# Patient Record
Sex: MALE | Race: ASIAN | NOT HISPANIC OR LATINO | URBAN - METROPOLITAN AREA
[De-identification: names, ages, dates, MRNs, and addresses within clinical notes are randomized per-mention and may not be internally consistent; named-entity substitution may affect disease eponyms.]

---

## 2017-01-14 ENCOUNTER — EMERGENCY (EMERGENCY)
Facility: HOSPITAL | Age: 47
LOS: 0 days | Discharge: HOME | End: 2017-01-14
Admitting: EMERGENCY MEDICINE
Payer: COMMERCIAL

## 2017-01-14 DIAGNOSIS — M54.5 LOW BACK PAIN: ICD-10-CM

## 2017-01-14 DIAGNOSIS — S39.82XA OTHER SPECIFIED INJURIES OF LOWER BACK, INITIAL ENCOUNTER: ICD-10-CM
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
## 2017-01-14 DIAGNOSIS — S33.5XXA SPRAIN OF LIGAMENTS OF LUMBAR SPINE, INITIAL ENCOUNTER: ICD-10-CM
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
## 2017-01-14 PROCEDURE — 72100 X-RAY EXAM L-S SPINE 2/3 VWS: CPT | Mod: 26

## 2017-01-14 PROCEDURE — 99283 EMERGENCY DEPT VISIT LOW MDM: CPT

## 2017-06-05 ENCOUNTER — OUTPATIENT (OUTPATIENT)
Dept: OUTPATIENT SERVICES | Facility: HOSPITAL | Age: 47
LOS: 1 days | Discharge: HOME | End: 2017-06-05
Payer: SELF-PAY

## 2017-06-05 DIAGNOSIS — Z02.1 ENCOUNTER FOR PRE-EMPLOYMENT EXAMINATION: ICD-10-CM

## 2017-06-05 PROCEDURE — 71020: CPT | Mod: 26

## 2018-02-16 ENCOUNTER — TRANSCRIPTION ENCOUNTER (OUTPATIENT)
Age: 48
End: 2018-02-16

## 2019-03-01 ENCOUNTER — APPOINTMENT (OUTPATIENT)
Dept: DERMATOLOGY | Facility: CLINIC | Age: 49
End: 2019-03-01
Payer: COMMERCIAL

## 2019-03-01 VITALS — BODY MASS INDEX: 27.31 KG/M2 | WEIGHT: 160 LBS | HEIGHT: 64 IN

## 2019-03-01 DIAGNOSIS — B36.0 PITYRIASIS VERSICOLOR: ICD-10-CM

## 2019-03-01 PROCEDURE — 99202 OFFICE O/P NEW SF 15 MIN: CPT

## 2019-03-01 RX ORDER — KETOCONAZOLE 20.5 MG/ML
2 SHAMPOO, SUSPENSION TOPICAL
Qty: 1 | Refills: 6 | Status: ACTIVE | COMMUNITY
Start: 2019-03-01 | End: 1900-01-01

## 2019-03-01 RX ORDER — FLUCONAZOLE 150 MG/1
150 TABLET ORAL
Qty: 4 | Refills: 0 | Status: ACTIVE | COMMUNITY
Start: 2019-03-01 | End: 1900-01-01

## 2019-03-29 ENCOUNTER — APPOINTMENT (OUTPATIENT)
Dept: ULTRASOUND IMAGING | Facility: IMAGING CENTER | Age: 49
End: 2019-03-29
Payer: COMMERCIAL

## 2019-03-29 ENCOUNTER — OUTPATIENT (OUTPATIENT)
Dept: OUTPATIENT SERVICES | Facility: HOSPITAL | Age: 49
LOS: 1 days | End: 2019-03-29
Payer: COMMERCIAL

## 2019-03-29 DIAGNOSIS — Z00.8 ENCOUNTER FOR OTHER GENERAL EXAMINATION: ICD-10-CM

## 2019-03-29 PROCEDURE — 76700 US EXAM ABDOM COMPLETE: CPT

## 2019-03-29 PROCEDURE — 76700 US EXAM ABDOM COMPLETE: CPT | Mod: 26

## 2019-07-05 RX ORDER — KETOCONAZOLE 20 MG/G
2 CREAM TOPICAL
Qty: 2 | Refills: 3 | Status: ACTIVE | COMMUNITY
Start: 2019-03-01 | End: 1900-01-01

## 2019-07-22 ENCOUNTER — RESULT REVIEW (OUTPATIENT)
Age: 49
End: 2019-07-22

## 2019-10-15 ENCOUNTER — APPOINTMENT (OUTPATIENT)
Dept: ULTRASOUND IMAGING | Facility: IMAGING CENTER | Age: 49
End: 2019-10-15

## 2019-10-18 ENCOUNTER — APPOINTMENT (OUTPATIENT)
Dept: MRI IMAGING | Facility: IMAGING CENTER | Age: 49
End: 2019-10-18

## 2019-11-09 ENCOUNTER — OUTPATIENT (OUTPATIENT)
Dept: OUTPATIENT SERVICES | Facility: HOSPITAL | Age: 49
LOS: 1 days | End: 2019-11-09
Payer: COMMERCIAL

## 2019-11-09 ENCOUNTER — APPOINTMENT (OUTPATIENT)
Dept: ULTRASOUND IMAGING | Facility: IMAGING CENTER | Age: 49
End: 2019-11-09
Payer: COMMERCIAL

## 2019-11-09 ENCOUNTER — APPOINTMENT (OUTPATIENT)
Dept: MRI IMAGING | Facility: IMAGING CENTER | Age: 49
End: 2019-11-09
Payer: COMMERCIAL

## 2019-11-09 DIAGNOSIS — Z00.8 ENCOUNTER FOR OTHER GENERAL EXAMINATION: ICD-10-CM

## 2019-11-09 PROCEDURE — 72148 MRI LUMBAR SPINE W/O DYE: CPT

## 2019-11-09 PROCEDURE — 76857 US EXAM PELVIC LIMITED: CPT | Mod: 26

## 2019-11-09 PROCEDURE — 72148 MRI LUMBAR SPINE W/O DYE: CPT | Mod: 26

## 2019-11-09 PROCEDURE — 76857 US EXAM PELVIC LIMITED: CPT

## 2020-04-01 ENCOUNTER — EMERGENCY (EMERGENCY)
Facility: HOSPITAL | Age: 50
LOS: 0 days | Discharge: HOME | End: 2020-04-01
Payer: COMMERCIAL

## 2020-04-01 VITALS
OXYGEN SATURATION: 94 % | DIASTOLIC BLOOD PRESSURE: 95 MMHG | RESPIRATION RATE: 18 BRPM | TEMPERATURE: 99 F | HEART RATE: 78 BPM | SYSTOLIC BLOOD PRESSURE: 162 MMHG

## 2020-04-01 VITALS — OXYGEN SATURATION: 97 %

## 2020-04-01 DIAGNOSIS — R05 COUGH: ICD-10-CM

## 2020-04-01 DIAGNOSIS — R50.9 FEVER, UNSPECIFIED: ICD-10-CM

## 2020-04-01 DIAGNOSIS — J06.9 ACUTE UPPER RESPIRATORY INFECTION, UNSPECIFIED: ICD-10-CM

## 2020-04-01 DIAGNOSIS — Z20.828 CONTACT WITH AND (SUSPECTED) EXPOSURE TO OTHER VIRAL COMMUNICABLE DISEASES: ICD-10-CM

## 2020-04-01 PROCEDURE — 99282 EMERGENCY DEPT VISIT SF MDM: CPT

## 2020-04-01 PROCEDURE — 99283 EMERGENCY DEPT VISIT LOW MDM: CPT

## 2020-04-01 PROCEDURE — 87635 SARS-COV-2 COVID-19 AMP PRB: CPT

## 2020-04-01 NOTE — ED STATDOCS - OBJECTIVE STATEMENT
Pt presents to ED with fever, cough, runny nose, body aches, sore throat x7  days. Pt recently exposed to COVID-19. Pt here for testing.

## 2020-04-01 NOTE — ED ADULT NURSE NOTE - CHIEF COMPLAINT QUOTE
pt presents to ED due to complaints of flu like sxs with fever and cough  Patient is an employee of [employer]. Pt has given verbal consent for employee health to call them with results.

## 2020-04-01 NOTE — ED STATDOCS - NS ED ROS FT
ROS: Constitutional- +fever, +chills.  Respiratory- +cough, -SOB  Cardiac- no chest pain, no palpitations, ENT- +rhinorrhea, no sore throat, no congestion.  Abdomen- No nausea, no vomiting, +diarrhea.  Urinary- no dysuria, no urgency, no frequency.  Skin- No rashes

## 2020-04-01 NOTE — ED STATDOCS - PMH
No pertinent past medical history <<----- Click to add NO pertinent Past Medical History Hypertension, unspecified type    Type 2 diabetes mellitus without complication, without long-term current use of insulin

## 2020-04-01 NOTE — ED STATDOCS - PATIENT PORTAL LINK FT
You can access the FollowMyHealth Patient Portal offered by [facility] by registering at the following website: [email or web address]. By joining [facility]’s FollowMyHealth portal, you will also be able to view your health information using other applications (apps) compatible with our system.

## 2020-04-01 NOTE — ED STATDOCS - NSFOLLOWUPINSTRUCTIONS_ED_ALL_ED_FT
How to get your Coronavirus (COVID-19) Testing Results:   Please be advised that you were tested for the coronavirus (COVID-19) in the Emergency Department at [facility].  You are to maintain self-quarantine procedures for 14 days until instructed otherwise by one of our healthcare agents. Please note that the test may take up to 2-4 days to result.  If you do not hear from us within 72 hours and you'd like to check on your results, you can call on of our coronavirus specialists at [address] [place] [address] (available 24/7).  Please DO NOT call the site where you received the test to obtain your results.     take tylenol as needed, return to the hospital if you have shortness of breath

## 2020-04-02 ENCOUNTER — APPOINTMENT (OUTPATIENT)
Dept: DISASTER EMERGENCY | Facility: CLINIC | Age: 50
End: 2020-04-02

## 2020-04-02 LAB — SARS-COV-2 RNA SPEC QL NAA+PROBE: SIGNIFICANT CHANGE UP

## 2020-04-26 ENCOUNTER — MESSAGE (OUTPATIENT)
Age: 50
End: 2020-04-26

## 2020-05-02 LAB
SARS-COV-2 IGG SERPL IA-ACNC: <0.1 INDEX
SARS-COV-2 IGG SERPL QL IA: NEGATIVE

## 2020-08-18 ENCOUNTER — EMERGENCY (EMERGENCY)
Facility: HOSPITAL | Age: 50
LOS: 0 days | Discharge: HOME | End: 2020-08-18
Payer: COMMERCIAL

## 2020-08-18 VITALS
HEART RATE: 85 BPM | RESPIRATION RATE: 18 BRPM | TEMPERATURE: 99 F | SYSTOLIC BLOOD PRESSURE: 137 MMHG | DIASTOLIC BLOOD PRESSURE: 85 MMHG | OXYGEN SATURATION: 96 %

## 2020-08-18 DIAGNOSIS — Z11.59 ENCOUNTER FOR SCREENING FOR OTHER VIRAL DISEASES: ICD-10-CM

## 2020-08-18 DIAGNOSIS — I10 ESSENTIAL (PRIMARY) HYPERTENSION: ICD-10-CM

## 2020-08-18 DIAGNOSIS — E11.9 TYPE 2 DIABETES MELLITUS WITHOUT COMPLICATIONS: ICD-10-CM

## 2020-08-18 PROCEDURE — 99283 EMERGENCY DEPT VISIT LOW MDM: CPT

## 2020-08-18 PROCEDURE — U0003: CPT

## 2020-08-18 NOTE — ED STATDOCS - PATIENT PORTAL LINK FT
You can access the FollowMyHealth Patient Portal offered by [facility] by registering at the following website: [email or web address]. By joining [facility]’s FollowMyHealth portal, you will also be able to view your health information using other applications (apps) compatible with our system.

## 2020-08-18 NOTE — ED STATDOCS - OBJECTIVE STATEMENT
51 yo male,  employee presents for covid testing. Pt drove to [place] to drop off his son in school and requires testing since its a "[place] zone" state.

## 2020-08-19 PROBLEM — I10 ESSENTIAL (PRIMARY) HYPERTENSION: Chronic | Status: ACTIVE | Noted: 2020-04-01

## 2020-08-19 PROBLEM — E11.9 TYPE 2 DIABETES MELLITUS WITHOUT COMPLICATIONS: Chronic | Status: ACTIVE | Noted: 2020-04-01

## 2020-08-20 LAB — SARS-COV-2 RNA SPEC QL NAA+PROBE: SIGNIFICANT CHANGE UP

## 2021-01-17 ENCOUNTER — TRANSCRIPTION ENCOUNTER (OUTPATIENT)
Age: 51
End: 2021-01-17

## 2021-06-01 ENCOUNTER — APPOINTMENT (OUTPATIENT)
Dept: CARDIOLOGY | Facility: CLINIC | Age: 51
End: 2021-06-01
Payer: COMMERCIAL

## 2021-06-01 ENCOUNTER — NON-APPOINTMENT (OUTPATIENT)
Age: 51
End: 2021-06-01

## 2021-06-01 VITALS
HEIGHT: 65 IN | WEIGHT: 173 LBS | SYSTOLIC BLOOD PRESSURE: 114 MMHG | OXYGEN SATURATION: 95 % | HEART RATE: 85 BPM | DIASTOLIC BLOOD PRESSURE: 70 MMHG | BODY MASS INDEX: 28.82 KG/M2

## 2021-06-01 DIAGNOSIS — E78.5 HYPERLIPIDEMIA, UNSPECIFIED: ICD-10-CM

## 2021-06-01 DIAGNOSIS — I10 ESSENTIAL (PRIMARY) HYPERTENSION: ICD-10-CM

## 2021-06-01 DIAGNOSIS — Z82.49 FAMILY HISTORY OF ISCHEMIC HEART DISEASE AND OTHER DISEASES OF THE CIRCULATORY SYSTEM: ICD-10-CM

## 2021-06-01 DIAGNOSIS — Z92.29 PERSONAL HISTORY OF OTHER DRUG THERAPY: ICD-10-CM

## 2021-06-01 DIAGNOSIS — K76.0 FATTY (CHANGE OF) LIVER, NOT ELSEWHERE CLASSIFIED: ICD-10-CM

## 2021-06-01 DIAGNOSIS — E11.9 TYPE 2 DIABETES MELLITUS W/OUT COMPLICATIONS: ICD-10-CM

## 2021-06-01 DIAGNOSIS — Z87.891 PERSONAL HISTORY OF NICOTINE DEPENDENCE: ICD-10-CM

## 2021-06-01 DIAGNOSIS — R06.00 DYSPNEA, UNSPECIFIED: ICD-10-CM

## 2021-06-01 PROCEDURE — 99204 OFFICE O/P NEW MOD 45 MIN: CPT

## 2021-06-01 PROCEDURE — 99072 ADDL SUPL MATRL&STAF TM PHE: CPT

## 2021-06-01 PROCEDURE — 93000 ELECTROCARDIOGRAM COMPLETE: CPT

## 2021-06-01 RX ORDER — HYDROCHLOROTHIAZIDE 12.5 MG/1
12.5 TABLET ORAL DAILY
Qty: 90 | Refills: 1 | Status: ACTIVE | COMMUNITY
Start: 2021-06-01

## 2021-06-01 RX ORDER — FAMOTIDINE 20 MG/1
20 TABLET, FILM COATED ORAL
Qty: 60 | Refills: 0 | Status: ACTIVE | COMMUNITY
Start: 2021-06-01

## 2021-06-01 RX ORDER — IRBESARTAN 75 MG/1
75 TABLET ORAL
Qty: 30 | Refills: 0 | Status: ACTIVE | COMMUNITY
Start: 2021-06-01

## 2021-06-01 RX ORDER — ASPIRIN ENTERIC COATED TABLETS 81 MG 81 MG/1
81 TABLET, DELAYED RELEASE ORAL
Qty: 30 | Refills: 3 | Status: ACTIVE | COMMUNITY
Start: 2021-06-01

## 2021-06-01 RX ORDER — METFORMIN HYDROCHLORIDE 500 MG/1
500 TABLET, COATED ORAL DAILY
Qty: 90 | Refills: 1 | Status: ACTIVE | COMMUNITY
Start: 2021-06-01

## 2021-06-01 NOTE — HISTORY OF PRESENT ILLNESS
[FreeTextEntry1] : Pt presents today as a new patient referred by PCP Dr. Peters for CAD workup.\par The patient presents for evaluation of high blood pressure. Patient is currently tolerating the current antihypertensive regime and they deny headaches, stiff neck, visual changes, pedal Edema or PND. They also are here for follow-up of elevated cholesterol and continued care of diabetes mellitus. Patient is currently tolerating medication and denies muscle pain, joint pain, back pain, tea, nausea, vomiting, abdominal pain or diarrhea. The patient is trying to follow a low cholesterol diet.  The patient is following the diabetic regimen and is tolerating hypoglycemic agents and following the diet.\par The patient presents today with complaints of occasional episodes of dyspnea that occur with rest and and exertion there's no regular  discernible pattern the patient does not feel it is associated with anxiety patient denies substernal chest pain, PND, with orthopnea and palpitations\par s/p labs at PCP  showed a1c 5/7, trigs 219 LDL 75\par pt had syncope  2-3 years. pt is a former smoker.\par \par pt 's  father who  at age %&  yo from [place].

## 2021-06-08 ENCOUNTER — APPOINTMENT (OUTPATIENT)
Dept: CT IMAGING | Facility: CLINIC | Age: 51
End: 2021-06-08
Payer: SELF-PAY

## 2021-06-08 ENCOUNTER — OUTPATIENT (OUTPATIENT)
Dept: OUTPATIENT SERVICES | Facility: HOSPITAL | Age: 51
LOS: 1 days | End: 2021-06-08
Payer: SELF-PAY

## 2021-06-08 DIAGNOSIS — Z82.49 FAMILY HISTORY OF ISCHEMIC HEART DISEASE AND OTHER DISEASES OF THE CIRCULATORY SYSTEM: ICD-10-CM

## 2021-06-08 DIAGNOSIS — E11.9 TYPE 2 DIABETES MELLITUS WITHOUT COMPLICATIONS: ICD-10-CM

## 2021-06-08 DIAGNOSIS — K76.0 FATTY (CHANGE OF) LIVER, NOT ELSEWHERE CLASSIFIED: ICD-10-CM

## 2021-06-08 DIAGNOSIS — E78.5 HYPERLIPIDEMIA, UNSPECIFIED: ICD-10-CM

## 2021-06-08 PROCEDURE — 75571 CT HRT W/O DYE W/CA TEST: CPT

## 2021-06-08 PROCEDURE — 75571 CT HRT W/O DYE W/CA TEST: CPT | Mod: 26

## 2021-06-10 DIAGNOSIS — R91.1 SOLITARY PULMONARY NODULE: ICD-10-CM

## 2021-06-10 DIAGNOSIS — R93.1 ABNORMAL FINDINGS ON DIAGNOSTIC IMAGING OF HEART AND CORONARY CIRCULATION: ICD-10-CM

## 2021-06-10 RX ORDER — ROSUVASTATIN CALCIUM 20 MG/1
20 TABLET, FILM COATED ORAL DAILY
Qty: 90 | Refills: 0 | Status: ACTIVE | COMMUNITY
Start: 2021-06-10 | End: 1900-01-01

## 2021-06-10 RX ORDER — PRAVASTATIN SODIUM 20 MG/1
20 TABLET ORAL
Qty: 30 | Refills: 3 | Status: DISCONTINUED | COMMUNITY
Start: 2021-06-01 | End: 2021-06-10

## 2021-06-15 ENCOUNTER — OUTPATIENT (OUTPATIENT)
Dept: OUTPATIENT SERVICES | Facility: HOSPITAL | Age: 51
LOS: 1 days | End: 2021-06-15
Payer: COMMERCIAL

## 2021-06-15 ENCOUNTER — APPOINTMENT (OUTPATIENT)
Dept: ULTRASOUND IMAGING | Facility: IMAGING CENTER | Age: 51
End: 2021-06-15
Payer: COMMERCIAL

## 2021-06-15 DIAGNOSIS — Z00.8 ENCOUNTER FOR OTHER GENERAL EXAMINATION: ICD-10-CM

## 2021-06-15 PROCEDURE — 76705 ECHO EXAM OF ABDOMEN: CPT

## 2021-06-15 PROCEDURE — 76705 ECHO EXAM OF ABDOMEN: CPT | Mod: 26,RT

## 2021-06-24 ENCOUNTER — APPOINTMENT (OUTPATIENT)
Dept: CARDIOLOGY | Facility: CLINIC | Age: 51
End: 2021-06-24
Payer: COMMERCIAL

## 2021-06-24 PROCEDURE — 93015 CV STRESS TEST SUPVJ I&R: CPT

## 2021-06-24 PROCEDURE — A9500: CPT

## 2021-06-24 PROCEDURE — 78452 HT MUSCLE IMAGE SPECT MULT: CPT

## 2021-06-24 PROCEDURE — 93306 TTE W/DOPPLER COMPLETE: CPT

## 2021-06-24 PROCEDURE — 99072 ADDL SUPL MATRL&STAF TM PHE: CPT

## 2021-06-28 ENCOUNTER — NON-APPOINTMENT (OUTPATIENT)
Age: 51
End: 2021-06-28

## 2021-10-06 PROBLEM — I10 ESSENTIAL HYPERTENSION: Status: ACTIVE | Noted: 2021-06-01

## 2021-12-21 ENCOUNTER — OUTPATIENT (OUTPATIENT)
Dept: OUTPATIENT SERVICES | Facility: HOSPITAL | Age: 51
LOS: 1 days | End: 2021-12-21
Payer: COMMERCIAL

## 2021-12-21 DIAGNOSIS — Z20.828 CONTACT WITH AND (SUSPECTED) EXPOSURE TO OTHER VIRAL COMMUNICABLE DISEASES: ICD-10-CM

## 2021-12-21 LAB — SARS-COV-2 RNA SPEC QL NAA+PROBE: SIGNIFICANT CHANGE UP

## 2021-12-21 PROCEDURE — U0005: CPT

## 2021-12-21 PROCEDURE — U0003: CPT

## 2021-12-22 DIAGNOSIS — Z20.828 CONTACT WITH AND (SUSPECTED) EXPOSURE TO OTHER VIRAL COMMUNICABLE DISEASES: ICD-10-CM

## 2022-04-12 ENCOUNTER — APPOINTMENT (OUTPATIENT)
Dept: ULTRASOUND IMAGING | Facility: CLINIC | Age: 52
End: 2022-04-12
Payer: COMMERCIAL

## 2022-04-12 ENCOUNTER — OUTPATIENT (OUTPATIENT)
Dept: OUTPATIENT SERVICES | Facility: HOSPITAL | Age: 52
LOS: 1 days | End: 2022-04-12
Payer: COMMERCIAL

## 2022-04-12 DIAGNOSIS — Z00.8 ENCOUNTER FOR OTHER GENERAL EXAMINATION: ICD-10-CM

## 2022-04-12 PROCEDURE — 76770 US EXAM ABDO BACK WALL COMP: CPT

## 2022-04-12 PROCEDURE — 76770 US EXAM ABDO BACK WALL COMP: CPT | Mod: 26

## 2022-04-29 ENCOUNTER — RESULT REVIEW (OUTPATIENT)
Age: 52
End: 2022-04-29

## 2022-05-03 ENCOUNTER — APPOINTMENT (OUTPATIENT)
Dept: UROLOGY | Facility: CLINIC | Age: 52
End: 2022-05-03
Payer: COMMERCIAL

## 2022-05-03 VITALS
OXYGEN SATURATION: 89 % | BODY MASS INDEX: 28.82 KG/M2 | HEIGHT: 65 IN | DIASTOLIC BLOOD PRESSURE: 82 MMHG | RESPIRATION RATE: 17 BRPM | SYSTOLIC BLOOD PRESSURE: 127 MMHG | HEART RATE: 89 BPM | WEIGHT: 173 LBS | TEMPERATURE: 98.1 F

## 2022-05-03 DIAGNOSIS — N20.0 CALCULUS OF KIDNEY: ICD-10-CM

## 2022-05-03 PROCEDURE — 99203 OFFICE O/P NEW LOW 30 MIN: CPT

## 2022-05-03 NOTE — ASSESSMENT
[FreeTextEntry1] : reviewed ULS with patient - small stone. Don't feel related to his discomfort - small and asymptomatic so observe.\par No hernia on exam - F/U PRN

## 2022-05-03 NOTE — HISTORY OF PRESENT ILLNESS
[FreeTextEntry1] : referred for management of a renal stone.\par he recalls having some pain on left side and told he had a small left renal stone.\par More recently noted intermittent groin pains, especially turning in bed.\par Had recent ULS noting 4mm mid pole left stone. No hydro and normal bladder.\par no hematuria, dysuria, UTI symptoms. Notes normal voiding habits other than occasional nocturia once.

## 2022-05-03 NOTE — PHYSICAL EXAM
[General Appearance - Well Developed] : well developed [General Appearance - Well Nourished] : well nourished [Edema] : no peripheral edema [Exaggerated Use Of Accessory Muscles For Inspiration] : no accessory muscle use [Abdomen Soft] : soft [Abdomen Tenderness] : non-tender [Abdomen Mass (___ Cm)] : no abdominal mass palpated [Abdomen Hernia] : no hernia was discovered [Urethral Meatus] : meatus normal [Penis Abnormality] : normal circumcised penis [Urinary Bladder Findings] : the bladder was normal on palpation [Scrotum] : the scrotum was normal [Epididymis] : the epididymides were normal [Testes Tenderness] : no tenderness of the testes [Testes Mass (___cm)] : there were no testicular masses [Normal Station and Gait] : the gait and station were normal for the patient's age [] : no rash [No Focal Deficits] : no focal deficits [Oriented To Time, Place, And Person] : oriented to person, place, and time

## 2022-05-04 LAB
APPEARANCE: CLEAR
BACTERIA UR CULT: NORMAL
BACTERIA: NEGATIVE
BILIRUBIN URINE: NEGATIVE
BLOOD URINE: NEGATIVE
COLOR: YELLOW
GLUCOSE QUALITATIVE U: NEGATIVE
HYALINE CASTS: 0 /LPF
KETONES URINE: NEGATIVE
LEUKOCYTE ESTERASE URINE: NEGATIVE
MICROSCOPIC-UA: NORMAL
NITRITE URINE: NEGATIVE
PH URINE: 6
PROTEIN URINE: NORMAL
RED BLOOD CELLS URINE: 2 /HPF
SPECIFIC GRAVITY URINE: 1.02
SQUAMOUS EPITHELIAL CELLS: 0 /HPF
UROBILINOGEN URINE: NORMAL
WHITE BLOOD CELLS URINE: 1 /HPF

## 2024-05-18 ENCOUNTER — OUTPATIENT (OUTPATIENT)
Dept: OUTPATIENT SERVICES | Facility: HOSPITAL | Age: 54
LOS: 1 days | End: 2024-05-18
Payer: COMMERCIAL

## 2024-05-18 ENCOUNTER — APPOINTMENT (OUTPATIENT)
Dept: MRI IMAGING | Facility: IMAGING CENTER | Age: 54
End: 2024-05-18
Payer: COMMERCIAL

## 2024-05-18 DIAGNOSIS — Z00.8 ENCOUNTER FOR OTHER GENERAL EXAMINATION: ICD-10-CM

## 2024-05-18 PROCEDURE — 73721 MRI JNT OF LWR EXTRE W/O DYE: CPT

## 2024-05-18 PROCEDURE — 73721 MRI JNT OF LWR EXTRE W/O DYE: CPT | Mod: 26,RT

## 2024-05-20 ENCOUNTER — APPOINTMENT (OUTPATIENT)
Dept: ULTRASOUND IMAGING | Facility: IMAGING CENTER | Age: 54
End: 2024-05-20

## 2024-07-17 ENCOUNTER — OUTPATIENT (OUTPATIENT)
Dept: OUTPATIENT SERVICES | Facility: HOSPITAL | Age: 54
LOS: 1 days | End: 2024-07-17
Payer: COMMERCIAL

## 2024-07-17 VITALS
TEMPERATURE: 99 F | HEIGHT: 64 IN | SYSTOLIC BLOOD PRESSURE: 130 MMHG | HEART RATE: 92 BPM | OXYGEN SATURATION: 95 % | WEIGHT: 171.96 LBS | RESPIRATION RATE: 18 BRPM | DIASTOLIC BLOOD PRESSURE: 90 MMHG

## 2024-07-17 DIAGNOSIS — S83.209A UNSPECIFIED TEAR OF UNSPECIFIED MENISCUS, CURRENT INJURY, UNSPECIFIED KNEE, INITIAL ENCOUNTER: ICD-10-CM

## 2024-07-17 DIAGNOSIS — E11.9 TYPE 2 DIABETES MELLITUS WITHOUT COMPLICATIONS: ICD-10-CM

## 2024-07-17 DIAGNOSIS — M25.569 PAIN IN UNSPECIFIED KNEE: ICD-10-CM

## 2024-07-17 DIAGNOSIS — I10 ESSENTIAL (PRIMARY) HYPERTENSION: ICD-10-CM

## 2024-07-17 LAB
ALBUMIN SERPL ELPH-MCNC: 4.9 G/DL — SIGNIFICANT CHANGE UP (ref 3.3–5)
ALP SERPL-CCNC: 112 U/L — SIGNIFICANT CHANGE UP (ref 40–120)
ALT FLD-CCNC: 54 U/L — HIGH (ref 10–45)
ANION GAP SERPL CALC-SCNC: 16 MMOL/L — SIGNIFICANT CHANGE UP (ref 5–17)
AST SERPL-CCNC: 42 U/L — HIGH (ref 10–40)
BILIRUB SERPL-MCNC: 1 MG/DL — SIGNIFICANT CHANGE UP (ref 0.2–1.2)
BUN SERPL-MCNC: 15 MG/DL — SIGNIFICANT CHANGE UP (ref 7–23)
CALCIUM SERPL-MCNC: 9.5 MG/DL — SIGNIFICANT CHANGE UP (ref 8.4–10.5)
CHLORIDE SERPL-SCNC: 100 MMOL/L — SIGNIFICANT CHANGE UP (ref 96–108)
CO2 SERPL-SCNC: 21 MMOL/L — LOW (ref 22–31)
CREAT SERPL-MCNC: 0.73 MG/DL — SIGNIFICANT CHANGE UP (ref 0.5–1.3)
EGFR: 107 ML/MIN/1.73M2 — SIGNIFICANT CHANGE UP
GLUCOSE SERPL-MCNC: 237 MG/DL — HIGH (ref 70–99)
HCT VFR BLD CALC: 41.6 % — SIGNIFICANT CHANGE UP (ref 39–50)
HGB BLD-MCNC: 14 G/DL — SIGNIFICANT CHANGE UP (ref 13–17)
MCHC RBC-ENTMCNC: 30.3 PG — SIGNIFICANT CHANGE UP (ref 27–34)
MCHC RBC-ENTMCNC: 33.7 GM/DL — SIGNIFICANT CHANGE UP (ref 32–36)
MCV RBC AUTO: 90 FL — SIGNIFICANT CHANGE UP (ref 80–100)
NRBC # BLD: 0 /100 WBCS — SIGNIFICANT CHANGE UP (ref 0–0)
PLATELET # BLD AUTO: 235 K/UL — SIGNIFICANT CHANGE UP (ref 150–400)
POTASSIUM SERPL-MCNC: 3.2 MMOL/L — LOW (ref 3.5–5.3)
POTASSIUM SERPL-SCNC: 3.2 MMOL/L — LOW (ref 3.5–5.3)
PROT SERPL-MCNC: 7.4 G/DL — SIGNIFICANT CHANGE UP (ref 6–8.3)
RBC # BLD: 4.62 M/UL — SIGNIFICANT CHANGE UP (ref 4.2–5.8)
RBC # FLD: 12.4 % — SIGNIFICANT CHANGE UP (ref 10.3–14.5)
SODIUM SERPL-SCNC: 137 MMOL/L — SIGNIFICANT CHANGE UP (ref 135–145)
WBC # BLD: 8.25 K/UL — SIGNIFICANT CHANGE UP (ref 3.8–10.5)
WBC # FLD AUTO: 8.25 K/UL — SIGNIFICANT CHANGE UP (ref 3.8–10.5)

## 2024-07-17 PROCEDURE — 80053 COMPREHEN METABOLIC PANEL: CPT

## 2024-07-17 PROCEDURE — G0463: CPT

## 2024-07-17 PROCEDURE — 83036 HEMOGLOBIN GLYCOSYLATED A1C: CPT

## 2024-07-17 PROCEDURE — 85027 COMPLETE CBC AUTOMATED: CPT

## 2024-07-17 RX ORDER — BACTERIOSTATIC SODIUM CHLORIDE 0.9 %
3 VIAL (ML) INJECTION EVERY 8 HOURS
Refills: 0 | Status: DISCONTINUED | OUTPATIENT
Start: 2024-07-31 | End: 2024-08-14

## 2024-07-17 RX ORDER — CHLORHEXIDINE GLUCONATE 500 MG/1
1 CLOTH TOPICAL DAILY
Refills: 0 | Status: DISCONTINUED | OUTPATIENT
Start: 2024-07-31 | End: 2024-08-14

## 2024-07-17 RX ORDER — DEXTROSE MONOHYDRATE, SODIUM CHLORIDE, SODIUM LACTATE, CALCIUM CHLORIDE, MAGNESIUM CHLORIDE 1.5; 538; 448; 18.4; 5.08 G/100ML; MG/100ML; MG/100ML; MG/100ML; MG/100ML
1000 SOLUTION INTRAPERITONEAL
Refills: 0 | Status: DISCONTINUED | OUTPATIENT
Start: 2024-07-31 | End: 2024-08-14

## 2024-07-17 NOTE — H&P PST ADULT - ADDITIONAL PE
DASI score: 9.89  DASI activity: minimal exercising, denies any cardiac symptoms   Loose teeth or denture: upper dentures

## 2024-07-17 NOTE — H&P PST ADULT - NEGATIVE GENERAL GENITOURINARY SYMPTOMS
no hematuria/no flank pain L/no flank pain R/no urine discoloration/no urinary hesitancy/normal urinary frequency

## 2024-07-17 NOTE — H&P PST ADULT - HISTORY OF PRESENT ILLNESS
Patient comes to [facility] for right knee arthroscopy medial menisectomy with MD Galloway on 7/31/24. Patient has a pmhx of HTN, DM, right knee pain, hep A ( as a child- no longer), GERD, HLD.    Patient reports as a child in high school injured right knee playing basketball. Patient reports at time was okay but as the years have gone on the pain has began to worsen. Reports pain worsens in the winter months and has right knee swelling. Reports certain movements exacerbates the pain. Reports tylenol or motrin help alleviate some discomfort. denies any numbness or tingling to right leg. Denies any other complaints.

## 2024-07-17 NOTE — H&P PST ADULT - NSICDXPASTMEDICALHX_GEN_ALL_CORE_FT
PAST MEDICAL HISTORY:  GERD (gastroesophageal reflux disease)     History of hepatitis A     Hyperlipidemia     Hypertension, unspecified type     Type 2 diabetes mellitus without complication, without long-term current use of insulin     Unspecified tear of unspecified meniscus, current injury, unspecified knee, initial encounter      PAST MEDICAL HISTORY:  Fatty liver     GERD (gastroesophageal reflux disease)     History of hepatitis A     Hyperlipidemia     Hypertension, unspecified type     Type 2 diabetes mellitus without complication, without long-term current use of insulin     Unspecified tear of unspecified meniscus, current injury, unspecified knee, initial encounter

## 2024-07-17 NOTE — H&P PST ADULT - MUSCULOSKELETAL
details… normal/decreased ROM due to pain/normal gait/strength 5/5 bilateral upper extremities/strength 5/5 bilateral lower extremities

## 2024-07-17 NOTE — H&P PST ADULT - PROBLEM SELECTOR PLAN 1
educated on instructions  Chlorhexidine wash given to patient and instructions  Labs in PST: CBC CMP A1C  Day of: FS

## 2024-07-18 LAB
A1C WITH ESTIMATED AVERAGE GLUCOSE RESULT: 6.7 % — HIGH (ref 4–5.6)
ESTIMATED AVERAGE GLUCOSE: 146 MG/DL — HIGH (ref 68–114)

## 2024-07-31 ENCOUNTER — OUTPATIENT (OUTPATIENT)
Dept: OUTPATIENT SERVICES | Facility: HOSPITAL | Age: 54
LOS: 1 days | End: 2024-07-31
Payer: COMMERCIAL

## 2024-07-31 VITALS
WEIGHT: 171.96 LBS | SYSTOLIC BLOOD PRESSURE: 112 MMHG | TEMPERATURE: 97 F | HEART RATE: 69 BPM | OXYGEN SATURATION: 95 % | DIASTOLIC BLOOD PRESSURE: 72 MMHG | RESPIRATION RATE: 16 BRPM | HEIGHT: 64 IN

## 2024-07-31 VITALS
DIASTOLIC BLOOD PRESSURE: 78 MMHG | RESPIRATION RATE: 16 BRPM | SYSTOLIC BLOOD PRESSURE: 127 MMHG | OXYGEN SATURATION: 100 % | HEART RATE: 79 BPM | TEMPERATURE: 97 F

## 2024-07-31 DIAGNOSIS — S83.209A UNSPECIFIED TEAR OF UNSPECIFIED MENISCUS, CURRENT INJURY, UNSPECIFIED KNEE, INITIAL ENCOUNTER: ICD-10-CM

## 2024-07-31 PROCEDURE — 29881 ARTHRS KNE SRG MNISECTMY M/L: CPT | Mod: RT

## 2024-07-31 PROCEDURE — 82962 GLUCOSE BLOOD TEST: CPT

## 2024-07-31 RX ORDER — METFORMIN HYDROCHLORIDE 850 MG/1
1 TABLET, FILM COATED ORAL
Refills: 0 | DISCHARGE

## 2024-07-31 RX ORDER — ASPIRIN 500 MG
1 TABLET ORAL
Qty: 14 | Refills: 0
Start: 2024-07-31 | End: 2024-08-13

## 2024-07-31 RX ORDER — FENTANYL CITRATE 1200 UG/1
25 LOZENGE ORAL; TRANSMUCOSAL
Refills: 0 | Status: DISCONTINUED | OUTPATIENT
Start: 2024-07-31 | End: 2024-07-31

## 2024-07-31 RX ORDER — HYDROCHLOROTHIAZIDE 25 MG
1 TABLET ORAL
Refills: 0 | DISCHARGE

## 2024-07-31 RX ORDER — FAMOTIDINE 40 MG
1 TABLET ORAL
Refills: 0 | DISCHARGE

## 2024-07-31 RX ORDER — OXYCODONE HYDROCHLORIDE 30 MG/1
1 TABLET ORAL
Qty: 10 | Refills: 0
Start: 2024-07-31

## 2024-07-31 RX ORDER — ROSUVASTATIN CALCIUM 20 MG/1
1 TABLET ORAL
Refills: 0 | DISCHARGE

## 2024-07-31 RX ORDER — DAPAGLIFLOZIN 10 MG/1
1 TABLET, FILM COATED ORAL
Refills: 0 | DISCHARGE

## 2024-07-31 RX ORDER — ONDANSETRON HCL/PF 4 MG/2 ML
1 VIAL (ML) INJECTION
Qty: 12 | Refills: 0
Start: 2024-07-31 | End: 2024-08-03

## 2024-07-31 RX ORDER — ONDANSETRON HCL/PF 4 MG/2 ML
4 VIAL (ML) INJECTION ONCE
Refills: 0 | Status: DISCONTINUED | OUTPATIENT
Start: 2024-07-31 | End: 2024-08-14

## 2024-07-31 RX ORDER — ASPIRIN 325 MG/1
0 TABLET, FILM COATED ORAL
Refills: 0 | DISCHARGE

## 2024-07-31 RX ADMIN — Medication 3 MILLILITER(S): at 11:53

## 2024-07-31 RX ADMIN — CHLORHEXIDINE GLUCONATE 1 APPLICATION(S): 500 CLOTH TOPICAL at 12:00

## 2024-07-31 RX ADMIN — DEXTROSE MONOHYDRATE, SODIUM CHLORIDE, SODIUM LACTATE, CALCIUM CHLORIDE, MAGNESIUM CHLORIDE 100 MILLILITER(S): 1.5; 538; 448; 18.4; 5.08 SOLUTION INTRAPERITONEAL at 12:00

## 2024-07-31 NOTE — ASU PREOP CHECKLIST - PATIENT'S PERSONAL PROPERTY REMOVED
partial upper labeled cup provided/dentures partial upper labeled cup provided, placed in locker by pt/dentures

## 2024-07-31 NOTE — ASU DISCHARGE PLAN (ADULT/PEDIATRIC) - MEDICATION INSTRUCTIONS
******************************************************************************************  Next dose of TYLENOL may be taken at or after _____________ PM if needed. DO NOT take any additional products containing TYLENOL or ACETAMINOPHEN, such as VICODIN, PERCOCET, NORCO, EXCEDRIN, and any over-the-counter cold medications until this time. DO NOT CONSUME MORE THAN 9684-3109 MG of TYLENOL (acetaminophen) in a 24-hour period.

## 2024-07-31 NOTE — ASU PATIENT PROFILE, ADULT - NSICDXPASTSURGICALHX_GEN_ALL_CORE_FT
Ordered through transplant physician - Dr. Jazzy Stahl MD/[facility] PAST SURGICAL HISTORY:  No significant past surgical history

## 2024-07-31 NOTE — ASU DISCHARGE PLAN (ADULT/PEDIATRIC) - CALL YOUR DOCTOR IF YOU HAVE ANY OF THE FOLLOWING:
100.4/Bleeding that does not stop/Swelling that gets worse/Pain not relieved by Medications/Fever greater than (need to indicate Fahrenheit or Celsius)/Numbness, tingling, color or temperature change to extremity/Nausea and vomiting that does not stop/Inability to tolerate liquids or foods

## 2024-07-31 NOTE — ASU PATIENT PROFILE, ADULT - FALL HARM RISK - UNIVERSAL INTERVENTIONS
Bed in lowest position, wheels locked, appropriate side rails in place/Call bell, personal items and telephone in reach/Instruct patient to call for assistance before getting out of bed or chair/Non-slip footwear when patient is out of bed/Gering to call system/Physically safe environment - no spills, clutter or unnecessary equipment/Purposeful Proactive Rounding/Room/bathroom lighting operational, light cord in reach

## 2024-07-31 NOTE — ASU DISCHARGE PLAN (ADULT/PEDIATRIC) - ASU DC SPECIAL INSTRUCTIONSFT
DEEP VENOUS THROMBOSIS PROPHYLAXIS: Please take Ecotrin (aspirin) 325mg 1x/day as prevention for blood clots for 2 weeks (prescription sent to pharmacy). You may resume your home baby aspirin (81mg) daily after these 2 weeks.    WOUND CARE: You may remove your dressing after 48 hours. Keep incisions clean, dry, and intact.    BATHING: You may begin to shower 2 days following surgery after removing your dressing. Please do not submerge wound underwater. Do not scrub incisions or apply lotions/creams at the surgical site.    ACTIVITY: Your weight-bearing status is weightbearing as tolerated in the right leg. If you are taking narcotic pain medication (such as oxycodone), do NOT drive a car, operate machinery, or make important decisions.    DIET: Return to your usual diet. You may take ondansetron (Zofran) for nausea/vomiting as needed (prescription sent to pharmacy).    NOTIFY YOUR SURGEON IF: You have any bleeding that does not stop, any pus draining from your wound, any fever (over 100.4 F) or chills, persistent nausea/vomiting, persistent diarrhea, or if your pain is not controlled on your discharge pain medications.    PAIN CONTROL: Please take medication as prescribed. If you have been prescribed a narcotic (such as oxycodone), please be aware that narcotic pain medicine can cause extreme nausea and constipation. Drink plenty of water and take stool softeners/laxatives (e.g., Colace, Miralax) as needed. You can get them from your local pharmacy. If you have been prescribed a narcotic (such as oxycodone), please take for severe pain only. You can take up to eight Tylenol 325 mg per day while taking oxycodone. Alternate between taking over the counter Ibuprofen and Tylenol so you are taking pain medication every 3-4 hours if your pain is severe. You can also place ice on the affected area for 20 minutes on then 20 minutes off, repeating the cycle as needed while awake.    FOLLOW-UP:  Follow-up with Dr. Galloway in 2 weeks following discharge. Please call his office for appointment if you do not already have one.

## 2024-07-31 NOTE — ASU PREOP CHECKLIST - HEIGHT IN FEET
5 Detail Level: Detailed Quality 110: Preventive Care And Screening: Influenza Immunization: Influenza immunization was not ordered or administered, reason not given

## 2024-07-31 NOTE — ASU DISCHARGE PLAN (ADULT/PEDIATRIC) - CARE PROVIDER_API CALL
Marko Galloway  Orthopaedic Surgery  [address] [place] [address], [address]  [place] [address]  Phone: [phone]  Fax: [phone]  Follow Up Time: 2 weeks

## 2024-07-31 NOTE — ASU PATIENT PROFILE, ADULT - NSICDXPASTMEDICALHX_GEN_ALL_CORE_FT
PAST MEDICAL HISTORY:  Fatty liver     GERD (gastroesophageal reflux disease)     History of hepatitis A     Hyperlipidemia     Hypertension, unspecified type     Type 2 diabetes mellitus without complication, without long-term current use of insulin     Unspecified tear of unspecified meniscus, current injury, unspecified knee, initial encounter

## 2024-07-31 NOTE — ASU DISCHARGE PLAN (ADULT/PEDIATRIC) - NS MD DC FALL RISK RISK
For information on Fall & Injury Prevention, visit: [email or web address] OR  [email or web address] OR  https://www.cdc.gov/steadi/patient.html

## 2024-09-20 PROBLEM — S83.209A UNSPECIFIED TEAR OF UNSPECIFIED MENISCUS, CURRENT INJURY, UNSPECIFIED KNEE, INITIAL ENCOUNTER: Chronic | Status: ACTIVE | Noted: 2024-07-17

## 2024-09-20 PROBLEM — K76.0 FATTY (CHANGE OF) LIVER, NOT ELSEWHERE CLASSIFIED: Chronic | Status: ACTIVE | Noted: 2024-07-18

## 2024-09-20 PROBLEM — E78.5 HYPERLIPIDEMIA, UNSPECIFIED: Chronic | Status: ACTIVE | Noted: 2024-07-17

## 2024-09-20 PROBLEM — K21.9 GASTRO-ESOPHAGEAL REFLUX DISEASE WITHOUT ESOPHAGITIS: Chronic | Status: ACTIVE | Noted: 2024-07-17

## 2024-09-20 PROBLEM — Z86.19 PERSONAL HISTORY OF OTHER INFECTIOUS AND PARASITIC DISEASES: Chronic | Status: ACTIVE | Noted: 2024-07-17

## 2024-11-07 ENCOUNTER — NON-APPOINTMENT (OUTPATIENT)
Age: 54
End: 2024-11-07

## 2024-11-07 ENCOUNTER — APPOINTMENT (OUTPATIENT)
Dept: OPHTHALMOLOGY | Facility: CLINIC | Age: 54
End: 2024-11-07

## 2024-11-07 PROCEDURE — 92004 COMPRE OPH EXAM NEW PT 1/>: CPT

## 2025-05-13 ENCOUNTER — APPOINTMENT (OUTPATIENT)
Dept: OPHTHALMOLOGY | Facility: CLINIC | Age: 55
End: 2025-05-13
Payer: COMMERCIAL

## 2025-05-13 ENCOUNTER — NON-APPOINTMENT (OUTPATIENT)
Age: 55
End: 2025-05-13

## 2025-05-13 PROCEDURE — 92083 EXTENDED VISUAL FIELD XM: CPT

## 2025-05-13 PROCEDURE — 92133 CPTRZD OPH DX IMG PST SGM ON: CPT

## 2025-05-13 PROCEDURE — 92012 INTRM OPH EXAM EST PATIENT: CPT

## 2025-05-13 PROCEDURE — 92020 GONIOSCOPY: CPT

## 2025-05-17 ENCOUNTER — APPOINTMENT (OUTPATIENT)
Dept: ULTRASOUND IMAGING | Facility: IMAGING CENTER | Age: 55
End: 2025-05-17
Payer: COMMERCIAL

## 2025-05-17 ENCOUNTER — OUTPATIENT (OUTPATIENT)
Dept: OUTPATIENT SERVICES | Facility: HOSPITAL | Age: 55
LOS: 1 days | End: 2025-05-17
Payer: COMMERCIAL

## 2025-05-17 DIAGNOSIS — Z00.8 ENCOUNTER FOR OTHER GENERAL EXAMINATION: ICD-10-CM

## 2025-05-17 PROCEDURE — 76705 ECHO EXAM OF ABDOMEN: CPT

## 2025-05-17 PROCEDURE — 76705 ECHO EXAM OF ABDOMEN: CPT | Mod: 26

## (undated) DEVICE — PACK KNEE ARTHROSCOPY

## (undated) DEVICE — SOL IRR BAG NS 0.9% 3000ML

## (undated) DEVICE — NDL HYPO SAFE 18G X 1.5" (PINK)

## (undated) DEVICE — SYR LUER LOK 20CC

## (undated) DEVICE — PREP CHLORAPREP HI-LITE ORANGE 26ML

## (undated) DEVICE — NSA-VIDEO TOWER - STRYKER: Type: DURABLE MEDICAL EQUIPMENT

## (undated) DEVICE — DRAPE EXTREMITY 87" X 128.5"

## (undated) DEVICE — TUBING STRYKER ARTHROSCOPY OUTFLOW

## (undated) DEVICE — TOURNIQUET ESMARK 6"

## (undated) DEVICE — NDL HYPO REGULAR BEVEL 18GA X 1.5" (PINK)

## (undated) DEVICE — SHAVER BLADE S&N CUTTER 3.5MM STRAIGHT (RED)

## (undated) DEVICE — SUT SURGIPRO 3-0 18" P-12

## (undated) DEVICE — S&N ARTHROCARE WAND COBLATION WEREWOLF FLOW 90

## (undated) DEVICE — Device

## (undated) DEVICE — TUBING STRYKER ARTHROSCOPY INFLOW

## (undated) DEVICE — SOL IRR POUR NS 0.9% 500ML

## (undated) DEVICE — SHAVER BLADE S&N FULL RADIUS 5.5MM STRAIGHT (ORANGE)

## (undated) DEVICE — SHAVER BLADE S&N INCISOR 4.5MM STRAIGHT (LIME GREEN)

## (undated) DEVICE — SPECIMEN CONTAINER 100ML

## (undated) DEVICE — SHAVER BLADE S&N FULL RADIUS 3.5MM STRAIGHT (BEIGE)

## (undated) DEVICE — GLV 7.5 PROTEXIS (WHITE)

## (undated) DEVICE — TOURNIQUET CUFF 42" DUAL PORT W PLC

## (undated) DEVICE — SUT MONOCRYL 3-0 27" PS-2 UNDYED

## (undated) DEVICE — NDL SPINAL 18G X 3.5" (PINK)

## (undated) DEVICE — NDL HYPO REGULAR BEVEL 22G X 1.5" (TURQUOISE)

## (undated) DEVICE — GLV 8 PROTEXIS (WHITE)

## (undated) DEVICE — POSITIONER FOAM EGG CRATE ULNAR 2PCS (PINK)

## (undated) DEVICE — WARMING BLANKET UPPER ADULT

## (undated) DEVICE — VENODYNE/SCD SLEEVE CALF MEDIUM

## (undated) DEVICE — TOURNIQUET CUFF 34" DUAL PORT W PLC